# Patient Record
Sex: MALE | Race: WHITE | NOT HISPANIC OR LATINO | URBAN - METROPOLITAN AREA
[De-identification: names, ages, dates, MRNs, and addresses within clinical notes are randomized per-mention and may not be internally consistent; named-entity substitution may affect disease eponyms.]

---

## 2018-02-15 ENCOUNTER — OFFICE (OUTPATIENT)
Dept: URBAN - METROPOLITAN AREA CLINIC 101 | Facility: CLINIC | Age: 28
End: 2018-02-15

## 2018-02-15 VITALS
WEIGHT: 268 LBS | TEMPERATURE: 98.1 F | SYSTOLIC BLOOD PRESSURE: 125 MMHG | HEIGHT: 71 IN | DIASTOLIC BLOOD PRESSURE: 78 MMHG | HEART RATE: 88 BPM

## 2018-02-15 DIAGNOSIS — K52.89 OTHER SPECIFIED NONINFECTIVE GASTROENTERITIS AND COLITIS: ICD-10-CM

## 2018-02-15 DIAGNOSIS — R15.2 FECAL URGENCY: ICD-10-CM

## 2018-02-15 DIAGNOSIS — R19.8 OTHER SPECIFIED SYMPTOMS AND SIGNS INVOLVING THE DIGESTIVE S: ICD-10-CM

## 2018-02-15 LAB
AMBIG ABBREV BMP8 DEFAULT: (no result)
BASIC METABOLIC PANEL (8): BUN/CREATININE RATIO: 17 (ref 9–20)
BASIC METABOLIC PANEL (8): BUN: 17 MG/DL (ref 6–20)
BASIC METABOLIC PANEL (8): CALCIUM, SERUM: 9.8 MG/DL (ref 8.7–10.2)
BASIC METABOLIC PANEL (8): CARBON DIOXIDE, TOTAL: 23 MMOL/L (ref 18–29)
BASIC METABOLIC PANEL (8): CHLORIDE, SERUM: 101 MMOL/L (ref 96–106)
BASIC METABOLIC PANEL (8): CREATININE, SERUM: 1.01 MG/DL (ref 0.76–1.27)
BASIC METABOLIC PANEL (8): EGFR IF AFRICN AM: 117 ML/MIN/1.73 (ref 59–?)
BASIC METABOLIC PANEL (8): EGFR IF NONAFRICN AM: 101 ML/MIN/1.73 (ref 59–?)
BASIC METABOLIC PANEL (8): GLUCOSE, SERUM: 128 MG/DL — HIGH (ref 65–99)
BASIC METABOLIC PANEL (8): POTASSIUM, SERUM: 4.6 MMOL/L (ref 3.5–5.2)
BASIC METABOLIC PANEL (8): SODIUM, SERUM: 143 MMOL/L (ref 134–144)
C-REACTIVE PROTEIN, QUANT: 1.1 MG/L (ref 0–4.9)
CBC WITH DIFFERENTIAL/PLATELET: BASO (ABSOLUTE): 0 X10E3/UL (ref 0–0.2)
CBC WITH DIFFERENTIAL/PLATELET: BASOS: 0 %
CBC WITH DIFFERENTIAL/PLATELET: EOS (ABSOLUTE): 0.1 X10E3/UL (ref 0–0.4)
CBC WITH DIFFERENTIAL/PLATELET: EOS: 2 %
CBC WITH DIFFERENTIAL/PLATELET: HEMATOCRIT: 48 % (ref 37.5–51)
CBC WITH DIFFERENTIAL/PLATELET: HEMATOLOGY COMMENTS: (no result)
CBC WITH DIFFERENTIAL/PLATELET: HEMOGLOBIN: 16.6 G/DL (ref 13–17.7)
CBC WITH DIFFERENTIAL/PLATELET: IMMATURE CELLS: (no result)
CBC WITH DIFFERENTIAL/PLATELET: IMMATURE GRANS (ABS): 0.1 X10E3/UL (ref 0–0.1)
CBC WITH DIFFERENTIAL/PLATELET: IMMATURE GRANULOCYTES: 1 %
CBC WITH DIFFERENTIAL/PLATELET: LYMPHS (ABSOLUTE): 2.8 X10E3/UL (ref 0.7–3.1)
CBC WITH DIFFERENTIAL/PLATELET: LYMPHS: 42 %
CBC WITH DIFFERENTIAL/PLATELET: MCH: 30.6 PG (ref 26.6–33)
CBC WITH DIFFERENTIAL/PLATELET: MCHC: 34.6 G/DL (ref 31.5–35.7)
CBC WITH DIFFERENTIAL/PLATELET: MCV: 89 FL (ref 79–97)
CBC WITH DIFFERENTIAL/PLATELET: MONOCYTES(ABSOLUTE): 0.4 X10E3/UL (ref 0.1–0.9)
CBC WITH DIFFERENTIAL/PLATELET: MONOCYTES: 6 %
CBC WITH DIFFERENTIAL/PLATELET: NEUTROPHILS (ABSOLUTE): 3.4 X10E3/UL (ref 1.4–7)
CBC WITH DIFFERENTIAL/PLATELET: NEUTROPHILS: 49 %
CBC WITH DIFFERENTIAL/PLATELET: NRBC: (no result)
CBC WITH DIFFERENTIAL/PLATELET: PLATELETS: 210 X10E3/UL (ref 150–379)
CBC WITH DIFFERENTIAL/PLATELET: RBC: 5.42 X10E6/UL (ref 4.14–5.8)
CBC WITH DIFFERENTIAL/PLATELET: RDW: 14 % (ref 12.3–15.4)
CBC WITH DIFFERENTIAL/PLATELET: WBC: 6.9 X10E3/UL (ref 3.4–10.8)
CELIAC DISEASE COMPREHENSIVE: DEAMIDATED GLIADIN ABS, IGA: 2 UNITS (ref 0–19)
CELIAC DISEASE COMPREHENSIVE: DEAMIDATED GLIADIN ABS, IGG: 2 UNITS (ref 0–19)
CELIAC DISEASE COMPREHENSIVE: ENDOMYSIAL ANTIBODY IGA: NEGATIVE
CELIAC DISEASE COMPREHENSIVE: IMMUNOGLOBULIN A, QN, SERUM: 180 MG/DL (ref 90–386)
CELIAC DISEASE COMPREHENSIVE: T-TRANSGLUTAMINASE (TTG) IGA: <2 U/ML
CELIAC DISEASE COMPREHENSIVE: T-TRANSGLUTAMINASE (TTG) IGG: <2 U/ML

## 2018-02-15 PROCEDURE — 99244 OFF/OP CNSLTJ NEW/EST MOD 40: CPT

## 2018-02-15 NOTE — INTERFACERESULTNOTES
Made wife aware, she stated that they would have to call back for the appointment as he had just started a new job.

## 2018-02-15 NOTE — INTERFACERESULTNOTES
no abnormalities. no inflammatory cells or signs of intestinal inflammation. no C diff. no abnormal digestion of fats. no signs of pancreatic insufficiency. continue with immodium as recommended at office appointment. routine follow up sometime in next 3-5 weeks to discuss next steps in care.

## 2018-02-15 NOTE — INTERFACERESULTNOTES
no abnormalities. blood tests for celiac disease (gluten/wheat allergy) are negative. normal blood counts, no anemia. normal electrolytes and kidney function. inflammatory marker is in normal range, which is good. awaiting stool studies. further recommendations pending these results.

## 2018-02-23 LAB
C DIFFICILE TOXIN GENE NAA: NEGATIVE
PANCREATIC ELASTASE, FECAL: >500 UG ELAST./G
RESULT: RESULT 1: (no result)
WHITE BLOOD CELLS (WBC), STOOL: (no result)

## 2023-04-20 ENCOUNTER — TELEHEALTH PROVIDED IN PATIENT'S HOME (OUTPATIENT)
Dept: URBAN - METROPOLITAN AREA TELEHEALTH 3 | Facility: TELEHEALTH | Age: 33
End: 2023-04-20

## 2023-04-20 VITALS — WEIGHT: 255 LBS | HEIGHT: 71 IN

## 2023-04-20 DIAGNOSIS — R15.2 FECAL URGENCY: ICD-10-CM

## 2023-04-20 DIAGNOSIS — K52.9 NONINFECTIVE GASTROENTERITIS AND COLITIS, UNSPECIFIED: ICD-10-CM

## 2023-04-20 DIAGNOSIS — R19.8 OTHER SPECIFIED SYMPTOMS AND SIGNS INVOLVING THE DIGESTIVE S: ICD-10-CM

## 2023-04-20 PROCEDURE — 99204 OFFICE O/P NEW MOD 45 MIN: CPT | Mod: 95 | Performed by: PHYSICIAN ASSISTANT

## 2023-04-20 NOTE — SERVICEHPINOTES
PATIENT VERIFIED BY DATE OF BIRTH AND NAME. Patient has been consented for this telecommunication visit.   Pt is here to discuss GI issues. Previously saw Dr. Meyer on 02/2018 for diarrhea. Evaluation at that time  was unremarkable (stool studies and bloodwork). For as long as he can remember, stools are all liquid and the diarrhea can "hit him out of nowhere". Per his wife, he may have diarrhea often throughout the day--has up to 6 BMs per day. He maybe longterm through a meal and then he has to have a BM. Recently, it has started impacting his life. The pt is a  and it is very difficult for him to work d/t symptoms. He had COVID in 2021 with GI involvement. During that time, he took imodium but this caused constipation. He hasn't tried anything else for diarrhea. No nocturnal diarrhea. No blood in the stool or per rectum. No pain, just some cramping prior to a BM which is alleviated by a BM. Wt is stable. + tenesmus. Symptoms overtime are now worse than when he saw us in 2018--more frequent. No specific food triggers. No family hx of GI issues. Social ETOH and he chews tobacco. No abuse of NSAIDS. No known heart issues.

## 2023-04-25 ENCOUNTER — OFFICE (OUTPATIENT)
Dept: URBAN - METROPOLITAN AREA CLINIC 102 | Facility: CLINIC | Age: 33
End: 2023-04-25

## 2023-04-25 LAB
C DIFFICILE TOXIN GENE NAA: NEGATIVE
CALPROTECTIN, FECAL: 23 UG/G (ref 0–120)

## 2023-04-25 PROCEDURE — 00031: CPT | Performed by: INTERNAL MEDICINE

## 2023-06-08 ENCOUNTER — OFFICE (OUTPATIENT)
Dept: URBAN - METROPOLITAN AREA PATHOLOGY 18 | Facility: PATHOLOGY | Age: 33
End: 2023-06-08
Payer: COMMERCIAL

## 2023-06-08 ENCOUNTER — OFFICE (OUTPATIENT)
Dept: URBAN - METROPOLITAN AREA CLINIC 98 | Facility: CLINIC | Age: 33
End: 2023-06-08
Payer: COMMERCIAL

## 2023-06-08 VITALS
TEMPERATURE: 97.9 F | SYSTOLIC BLOOD PRESSURE: 127 MMHG | DIASTOLIC BLOOD PRESSURE: 88 MMHG | OXYGEN SATURATION: 88 % | TEMPERATURE: 98 F | OXYGEN SATURATION: 91 % | OXYGEN SATURATION: 97 % | HEART RATE: 92 BPM | SYSTOLIC BLOOD PRESSURE: 128 MMHG | RESPIRATION RATE: 13 BRPM | SYSTOLIC BLOOD PRESSURE: 117 MMHG | OXYGEN SATURATION: 98 % | WEIGHT: 255 LBS | DIASTOLIC BLOOD PRESSURE: 76 MMHG | HEART RATE: 81 BPM | RESPIRATION RATE: 19 BRPM | HEART RATE: 95 BPM | DIASTOLIC BLOOD PRESSURE: 66 MMHG | HEART RATE: 78 BPM | SYSTOLIC BLOOD PRESSURE: 126 MMHG | DIASTOLIC BLOOD PRESSURE: 71 MMHG | OXYGEN SATURATION: 100 % | RESPIRATION RATE: 16 BRPM | HEIGHT: 71 IN | HEART RATE: 93 BPM | HEART RATE: 97 BPM | RESPIRATION RATE: 18 BRPM | DIASTOLIC BLOOD PRESSURE: 81 MMHG

## 2023-06-08 DIAGNOSIS — K52.9 NONINFECTIVE GASTROENTERITIS AND COLITIS, UNSPECIFIED: ICD-10-CM

## 2023-06-08 DIAGNOSIS — K64.0 FIRST DEGREE HEMORRHOIDS: ICD-10-CM

## 2023-06-08 PROCEDURE — 88305 TISSUE EXAM BY PATHOLOGIST: CPT | Performed by: PATHOLOGY

## 2023-06-08 RX ORDER — HYOSCYAMINE SULFATE 0.12 MG/1
TABLET ORAL; SUBLINGUAL
Qty: 30 | Refills: 3 | Status: ACTIVE
Start: 2023-06-08